# Patient Record
Sex: MALE | Race: BLACK OR AFRICAN AMERICAN | NOT HISPANIC OR LATINO | Employment: UNEMPLOYED | URBAN - METROPOLITAN AREA
[De-identification: names, ages, dates, MRNs, and addresses within clinical notes are randomized per-mention and may not be internally consistent; named-entity substitution may affect disease eponyms.]

---

## 2020-04-20 PROBLEM — S02.601D: Status: ACTIVE | Noted: 2020-04-20

## 2020-04-23 PROBLEM — S02.601A CLOSED FRACTURE OF RIGHT SIDE OF MANDIBULAR BODY: Status: ACTIVE | Noted: 2020-04-23

## 2020-05-12 PROBLEM — K05.30 PERIODONTITIS: Status: ACTIVE | Noted: 2020-05-12

## 2020-06-04 PROBLEM — T84.7XXA HARDWARE COMPLICATING WOUND INFECTION: Status: ACTIVE | Noted: 2020-06-04

## 2020-06-04 PROBLEM — S02.601K: Status: ACTIVE | Noted: 2020-06-04

## 2020-06-04 PROBLEM — K12.2 ORAL FISTULA: Status: ACTIVE | Noted: 2020-06-04

## 2020-06-17 ENCOUNTER — NURSE TRIAGE (OUTPATIENT)
Dept: ADMINISTRATIVE | Facility: CLINIC | Age: 50
End: 2020-06-17

## 2020-06-17 NOTE — TELEPHONE ENCOUNTER
Patient on [facility]'s post procedure call back system tracker.  Patient seen in ED  Yesterday  No contact required